# Patient Record
Sex: FEMALE | Race: WHITE
[De-identification: names, ages, dates, MRNs, and addresses within clinical notes are randomized per-mention and may not be internally consistent; named-entity substitution may affect disease eponyms.]

---
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
## 2018-11-09 NOTE — DIAGNOSTIC IMAGING REPORT
PROCEDURE:BIOPSY THYROID FNA

COMPARISON:[facility], US, OUTSIDE ULTRASOUND IMAGES, 

10/30/2018, 13:49.

INDICATIONS:ABN THYROID US

 

FINDINGS:

Written and verbal consent were obtained. Patient was placed supine. 

Preliminary ultrasound identified a dominant suspicious appearing right 

thyroid nodule (previously measured on the outside imaging to be 3.5 x 

3.3 x 2.5 cm). A safe entry route was identified and the overlying skin 

was prepped and draped in usual sterile fashion. Lidocaine 1% was used 

for local pain control.  

 

A total of 4 passes were performed utilizing 25 gauge needles in 

different locations of the right thyroid nodule.

 

The patient tolerated the procedure well and there were no immediate 

post-procedural complications.

 

CONCLUSION:

Successful ultrasound-guided FNA of a dominant right thyroid nodule. 

Pathologist has deemed the samples adequate for diagnosis. 

 

Salvador Victor D.O.  

Dictated by:  Salvador Victor D.O. on 11/09/2018 at 12:40     

Electronically approved by:  Salvador Victor D.O. on 11/09/2018 at 12:40

## 2018-11-09 NOTE — DIAGNOSTIC IMAGING REPORT
PROCEDURE:ULTRASOUND GUIDANCE FOR RIGHT THYROID NODULE FNA

 

Informed consent was obtained.  Ultrasound was used to identify the 

large right thyroid nodule.

 

The overlying skin was prepped and draped in the usual sterile fashion. 

Lidocaine 1% was infiltrated into the subcutaneous tissues for local 

anesthesia. 

 

CONCLUSION:Ultrasound guidance for FNA of the right thyroid nodule. 

 

 

Salvador Victor D.O.  

Dictated by:  Salvador Victor D.O. on 11/09/2018 at 16:57     

Electronically approved by:  Salvador Victor D.O. on 11/09/2018 at 16:57

## 2020-06-05 ENCOUNTER — HOSPITAL ENCOUNTER (OUTPATIENT)
Dept: HOSPITAL 88 - ER | Age: 55
Setting detail: OBSERVATION
LOS: 2 days | Discharge: HOME | End: 2020-06-07
Attending: INTERNAL MEDICINE | Admitting: INTERNAL MEDICINE
Payer: COMMERCIAL

## 2020-06-05 VITALS — SYSTOLIC BLOOD PRESSURE: 131 MMHG | DIASTOLIC BLOOD PRESSURE: 84 MMHG

## 2020-06-05 VITALS — DIASTOLIC BLOOD PRESSURE: 84 MMHG | SYSTOLIC BLOOD PRESSURE: 131 MMHG

## 2020-06-05 VITALS — DIASTOLIC BLOOD PRESSURE: 86 MMHG | SYSTOLIC BLOOD PRESSURE: 135 MMHG

## 2020-06-05 VITALS — HEIGHT: 66 IN | BODY MASS INDEX: 31.82 KG/M2 | WEIGHT: 198 LBS

## 2020-06-05 VITALS — SYSTOLIC BLOOD PRESSURE: 135 MMHG | DIASTOLIC BLOOD PRESSURE: 86 MMHG

## 2020-06-05 VITALS — SYSTOLIC BLOOD PRESSURE: 121 MMHG | DIASTOLIC BLOOD PRESSURE: 75 MMHG

## 2020-06-05 DIAGNOSIS — Z11.59: ICD-10-CM

## 2020-06-05 DIAGNOSIS — I10: ICD-10-CM

## 2020-06-05 DIAGNOSIS — E11.65: ICD-10-CM

## 2020-06-05 DIAGNOSIS — I25.110: Primary | ICD-10-CM

## 2020-06-05 DIAGNOSIS — E66.9: ICD-10-CM

## 2020-06-05 DIAGNOSIS — Z95.5: ICD-10-CM

## 2020-06-05 DIAGNOSIS — E78.00: ICD-10-CM

## 2020-06-05 LAB
ALBUMIN SERPL-MCNC: 4 G/DL (ref 3.5–5)
ALBUMIN/GLOB SERPL: 1.2 {RATIO} (ref 0.8–2)
ALP SERPL-CCNC: 76 IU/L (ref 40–150)
ALT SERPL-CCNC: 21 IU/L (ref 0–55)
ANION GAP SERPL CALC-SCNC: 13.9 MMOL/L (ref 8–16)
BASOPHILS # BLD AUTO: 0.1 10*3/UL (ref 0–0.1)
BASOPHILS NFR BLD AUTO: 0.8 % (ref 0–1)
BUN SERPL-MCNC: 18 MG/DL (ref 7–26)
BUN/CREAT SERPL: 22 (ref 6–25)
CALCIUM SERPL-MCNC: 9.4 MG/DL (ref 8.4–10.2)
CHLORIDE SERPL-SCNC: 103 MMOL/L (ref 98–107)
CK MB SERPL-MCNC: 3.8 NG/ML (ref 0–5)
CK MB SERPL-MCNC: 3.8 NG/ML (ref 0–5)
CK MB SERPL-MCNC: 3.9 NG/ML (ref 0–5)
CK SERPL-CCNC: 70 IU/L (ref 29–168)
CK SERPL-CCNC: 82 IU/L (ref 29–168)
CK SERPL-CCNC: 87 IU/L (ref 29–168)
CO2 SERPL-SCNC: 22 MMOL/L (ref 22–29)
DEPRECATED NEUTROPHILS # BLD AUTO: 4.5 10*3/UL (ref 2.1–6.9)
EGFRCR SERPLBLD CKD-EPI 2021: > 60 ML/MIN (ref 60–?)
EOSINOPHIL # BLD AUTO: 0.1 10*3/UL (ref 0–0.4)
EOSINOPHIL NFR BLD AUTO: 1.4 % (ref 0–6)
ERYTHROCYTE [DISTWIDTH] IN CORD BLOOD: 12.3 % (ref 11.7–14.4)
GLOBULIN PLAS-MCNC: 3.4 G/DL (ref 2.3–3.5)
GLUCOSE SERPLBLD-MCNC: 402 MG/DL (ref 74–118)
HCT VFR BLD AUTO: 43.2 % (ref 34.2–44.1)
HGB BLD-MCNC: 14.8 G/DL (ref 12–16)
LYMPHOCYTES # BLD: 2 10*3/UL (ref 1–3.2)
LYMPHOCYTES NFR BLD AUTO: 27.6 % (ref 18–39.1)
MCH RBC QN AUTO: 28.5 PG (ref 28–32)
MCHC RBC AUTO-ENTMCNC: 34.3 G/DL (ref 31–35)
MCV RBC AUTO: 83.2 FL (ref 81–99)
MONOCYTES # BLD AUTO: 0.5 10*3/UL (ref 0.2–0.8)
MONOCYTES NFR BLD AUTO: 6.6 % (ref 4.4–11.3)
NEUTS SEG NFR BLD AUTO: 62.6 % (ref 38.7–80)
PLATELET # BLD AUTO: 281 X10E3/UL (ref 140–360)
POTASSIUM SERPL-SCNC: 3.9 MMOL/L (ref 3.5–5.1)
RBC # BLD AUTO: 5.19 X10E6/UL (ref 3.6–5.1)
SODIUM SERPL-SCNC: 135 MMOL/L (ref 136–145)

## 2020-06-05 PROCEDURE — 80053 COMPREHEN METABOLIC PANEL: CPT

## 2020-06-05 PROCEDURE — 93005 ELECTROCARDIOGRAM TRACING: CPT

## 2020-06-05 PROCEDURE — 92928 PRQ TCAT PLMT NTRAC ST 1 LES: CPT

## 2020-06-05 PROCEDURE — 83880 ASSAY OF NATRIURETIC PEPTIDE: CPT

## 2020-06-05 PROCEDURE — 85025 COMPLETE CBC W/AUTO DIFF WBC: CPT

## 2020-06-05 PROCEDURE — 87635 SARS-COV-2 COVID-19 AMP PRB: CPT

## 2020-06-05 PROCEDURE — 82948 REAGENT STRIP/BLOOD GLUCOSE: CPT

## 2020-06-05 PROCEDURE — 99153 MOD SED SAME PHYS/QHP EA: CPT

## 2020-06-05 PROCEDURE — 71045 X-RAY EXAM CHEST 1 VIEW: CPT

## 2020-06-05 PROCEDURE — 93017 CV STRESS TEST TRACING ONLY: CPT

## 2020-06-05 PROCEDURE — 36415 COLL VENOUS BLD VENIPUNCTURE: CPT

## 2020-06-05 PROCEDURE — 93306 TTE W/DOPPLER COMPLETE: CPT

## 2020-06-05 PROCEDURE — 99284 EMERGENCY DEPT VISIT MOD MDM: CPT

## 2020-06-05 PROCEDURE — 84484 ASSAY OF TROPONIN QUANT: CPT

## 2020-06-05 PROCEDURE — 86140 C-REACTIVE PROTEIN: CPT

## 2020-06-05 PROCEDURE — 78452 HT MUSCLE IMAGE SPECT MULT: CPT

## 2020-06-05 PROCEDURE — 85379 FIBRIN DEGRADATION QUANT: CPT

## 2020-06-05 PROCEDURE — 93454 CORONARY ARTERY ANGIO S&I: CPT

## 2020-06-05 PROCEDURE — 80061 LIPID PANEL: CPT

## 2020-06-05 PROCEDURE — 82550 ASSAY OF CK (CPK): CPT

## 2020-06-05 PROCEDURE — 99152 MOD SED SAME PHYS/QHP 5/>YRS: CPT

## 2020-06-05 PROCEDURE — 82553 CREATINE MB FRACTION: CPT

## 2020-06-05 RX ADMIN — INSULIN HUMAN SCH UNIT: 100 INJECTION, SOLUTION PARENTERAL at 21:15

## 2020-06-05 RX ADMIN — INSULIN HUMAN SCH UNIT: 100 INJECTION, SOLUTION PARENTERAL at 17:10

## 2020-06-05 RX ADMIN — INSULIN HUMAN SCH UNIT: 100 INJECTION, SOLUTION PARENTERAL at 13:09

## 2020-06-05 NOTE — EMERGENCY DEPARTMENT NOTE
History of Present Illnes


History of Present Illness


Chief Complaint:  Chest Pain


History of Present Illness


This is a 55 year old  female  presents with complaint of sternal chest

pain for the last 3 days that waxes and wanes has been there more than it's been

gone. Patient saw her PCP yesterday had EKG and lab work done. Patient denies 

cough denies shortness of breath but does report that the pain is worse when she

tries to lay on her chest or when she takes a deep breath.. States the pain was 

there most all day yesterday and then eased up last night. And then about 7 

hours ago the pain returned and has been there ever since.


Historian:  Patient


Arrival Mode:  Car


Onset (how long ago):  day(s) (3)


Location:  midsternal chest


Quality:  pain


Radiation:  non-radiation


Severity:  moderate


Onset quality:  gradual


Duration (how long):  day(s) (3)


Timing of current episode:  constant, other


Progression:  waxing and waning


Chronicity:  new


Context:  recent illness, recent surgery


Relieving factors:  none


Exacerbating factors:  none


Associated symptoms:  denies other symptoms


Treatments prior to arrival:  none


 (FORTINO DUFF MD)





Past Medical/Family History


Physician Review


I have reviewed the patient's past medical and family history.  Any updates have

been documented here.


 (FORTINO DUFF MD)





Past Medical History


Recent Fever:  No


Clinical Suspicion of Infectio:  No


New/Unexplained Change in Ment:  No


Past Medical History:  Diabetes, Cancer (breast, remission for 15 years)


Other Surgery:  


bilateral mastectomy





 (FORTINO DUFF MD)





Social History


Smoking Cessation:  Never Smoker


Counseling Performed:  No


Alcohol Use:  None


Any Illegal Drug Use:  No


TB Exposure/Symptoms:  No


Physically hurt or threatened:  No


 (FORTINO DUFF MD)





Family History


Family history of heart diseas:  No


Other family history


htn


 (FORTINO DUFF MD)





Other


Last Tetanus:  unk


Is patient up to date on immun:  No


Last Flu:  DENIES


Last Pneumovax:  DENIES


 (FORTINO DUFF MD)





Review of Systems


Review of Systems


Constitutional:  no symptoms


EENTM:  no symptoms


Cardiovascular:  as per HPI


Respiratory:  as per HPI


Gastrointestinal:  no symptoms


Genitourinary:  no symptoms


Musculoskeletal:  no symptoms


Neurological:  no symptoms


Psychological:  no symptoms


Endocrine:  no symptoms


Hematological/Lymphatic:  no symptoms


Review of other systems


All other systems reviewed and negative.


 (FORTINO DUFF MD)





Physical Exam


Related Data


Allergies:  


Uncoded Allergies:  


     ERYTHROMYCIN (Allergy, Unknown, 10/27/18)


Triage Vital Signs





Vital Signs








  Date Time  Temp Pulse Resp B/P (MAP) Pulse Ox O2 Delivery O2 Flow Rate FiO2


 


6/5/20 05:39 98.0 105 18 160/104 98   








Vital signs reviewed:  Yes


 (FORTINO DUFF MD)





Physical Exam


CONSTITUTIONAL





Constitutional:  well-developed, well-nourished


HENT


HENT:  normocephalic, atraumatic, oropharynx clear/moist, nose normal


HENT L/R:  left ext ear normal, right ext ear normal


EYES





Eyes:  PERRL, conjunctivae normal


NECK


Neck:  ROM normal


PULMONARY


Pulmonary:  effort normal, breath sounds normal


CARDIOVASCULAR





Cardiovascular:  regular rhythm, heart sounds normal, capillary refill normal, 

normal rate, other (chest pain is reproducible with palpation of sternum, and 

with deep inspiration.)


GASTROINTESTINAL





Abdominal:  soft, nontender, bowel sounds normal


GENITOURINARY





Genitourinary:  exam deferred


SKIN


Skin:  warm, dry


MUSCULOSKELETAL





Musculoskeletal:  ROM normal


NEUROLOGICAL





Neurological:  alert, oriented x 3, no gross motor or sensory deficits


PSYCHOLOGICAL


Psychological:  mood/affect normal, judgement normal (FORTINO DUFF MD)





Procedures


12 Lead ECG Interpretation


:  Interpreted by ED physician


Date:  Jun 5, 2020


Time:  05:49


Rhythm:  sinus rhythm


Rate:  normal


BPM:  93


QRS axis:  normal


ST segments normal:  Yes


T waves normal:  Yes


Other findings:  no other findings


Q waves:  V1, V2, V3, V4


Clinical Impression:  abnormal ECG


 (FORTINO DUFF MD)





Critical Care Time


Subsequent provider


I assumed direction of critical care for this patient from another provider of 

my specialty.


 (FORTINO DUFF MD)





Assessment & Plan


Patient with chest pain for 3 days that waxes and wanes. Chest pain is 

reproducible on palpation of sternum.





CBC, CMP, cardiac enzymes, BNP, chest x-ray, EKG, and d-dimer ordered to eval 

for myocardial infarction, pneumonia, electrolyte abnormality, pulmonary embolus


 (FORTINO DUFF MD)


Final Impression:  


(1) UNSTABLE ANGINA


Assessment & Plan


Admission for further workup and management


 (HARMAN NUÑEZ, DO)


Last Vital Signs











  Date Time  Temp Pulse Resp B/P (MAP) Pulse Ox O2 Delivery O2 Flow Rate FiO2


 


6/5/20 05:39 98.0 105 18 160/104 98   








 (FORTINO DUFF MD)


Home Meds


Reported Medications


Lisinopril (LISINOPRIL) 10 Mg Tablet, 20 MG PO DAILY, #30 TAB


   6/5/20


Metformin Hcl (METFORMIN HCL) 500 Mg Tablet, 500 MG PO BID, #60 TAB


   6/5/20





Physician Attestation


Provider Attestation


55-year-old female brought to the ED with complaints of atypical chest pain, 

uncontrolled diabetic and hypertensive. Patient with a high heart score. EKG and

cardiac enzymes reviewed. Patient admitted for serial cardiac enzymes and 

cardiology evaluation. Case is discussed with Dr. Gallagher for further workup and 

management.


 (HARMAN NUÑEZ DO)











FORTINO DUFF MD         Jun 5, 2020 06:46


HARMAN NUÑEZ DO             Jun 5, 2020 16:56

## 2020-06-05 NOTE — NUR
Patient arrived to the unit @ 1103 via wheelchair. Patient in stable condition, no s/s of 
distress noted. No pain voiced. Telemetry applied. Iv site asymptomatic and patient with 
transparent dressing C/D/I. Bed in lowest position and locked. Call light within reach.

## 2020-06-05 NOTE — NUR
PATIENT UPDATED ON PLAN OF CARE, INCLUDING NEED FOR ADDITIONAL LAB RESULTS; 
PATIENT MEDICATED WITH TORADOL AT THIS TIME

## 2020-06-05 NOTE — NUR
Completed bedside shift report and rounding with the oncoming night nurse. patient in stable 
condition, no s/s of distress noted. No pain voiced. Telemetry applied. Bed in lowest 
position and locked. Call light within reach.
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
## 2020-06-05 NOTE — DIAGNOSTIC IMAGING REPORT
EXAMINATION:  CHEST SINGLE (NOT PORTABLE)   



COMPARISON:  None



INDICATION:  

^chest pain

^20200605

^0630

^Y   



DISCUSSION:

Frontal view of the chest obtained at 0640 hours.



HEART AND MEDIASTINUM:  The cardiomediastinal silhouette is unremarkable.

  

LINES:  None.



LUNGS/PLEURA:  The lungs are well inflated and clear. No pneumonia or pulmonary

edema. No pleural effusion or pneumothorax.



BONES AND SOFT TISSUES:  No focal osseous lesion. The soft tissues are normal.





IMPRESSION: 

No acute cardiopulmonary disease.



Signed by: Dr. Tc Rodriguez MD on 6/5/2020 7:03 AM
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
## 2020-06-05 NOTE — CONSULTATION
DATE OF CONSULTATION:  06/05/2020

 

Cardiology Consultation Note 

 

REQUESTING PHYSICIAN:  Tapan Duran M.D.

 

REASON FOR CONSULTATION:  Chest pain.

 

HISTORY OF PRESENT ILLNESS:  This is a 55-year-old woman with history of diabetes

mellitus and hypertension, who presents with complaints of chest pain.  She reports she

has been having palpitations for the last couple of months.  In the last few days, she

endorses chest tightness, which she describes as 11/10 in severity, associated with

shortness of breath and occasional diaphoresis.  There was no nausea or radiation.

Reports the pain has comes and goes, but has been more or less constant.  She reports

that chest pain is improved with sitting up and worse with lying down.  Of note, her

blood pressure has been running high for the last few days as well, so she saw her

primary care physician yesterday and was started on blood pressure medication.  She

denies any edema orthopnea or PND. 

 

REVIEW OF SYSTEMS:

Negative except as per HPI.

 

PAST MEDICAL HISTORY:  

1. Hypertension.

2. Diabetes mellitus.

 

PAST SURGICAL HISTORY:  

1. Hysterectomy.

2. Bilateral mastectomies for breast cancer.

3. Tonsillectomy.

 

ALLERGIES:  PLEASE SEE EMR.

 

MEDICATIONS:  Please see medication list.

 

SOCIAL HISTORY:  No tobacco, alcohol, or illicit drugs.

 

FAMILY HISTORY:  Pertinent for mother with atrial fibrillation and ICD.

 

PHYSICAL EXAMINATION:

VITAL SIGNS:  Temperature 98.1 degrees, pulse 86, respiratory rate 20, blood pressure

131/84, and oxygen saturation 99% on room air. 

GENERAL:  Well-developed, well-nourished woman, awake and alert, in no distress. 

HEENT:  Normocephalic, atraumatic.  Pupils equal.  No scleral icterus. 

NECK:  Supple.  No thyromegaly or cervical lymphadenopathy.  No carotid bruits. 

LUNGS:  Clear to auscultation bilaterally.  No wheeze or crackles. 

CARDIOVASCULAR:  Normal rate, regular rhythm.  No murmur.  Normal S1, S2. 

ABDOMEN:  Soft, nontender. 

EXTREMITIES:  No edema. 

NEUROLOGIC:  Nonfocal exam.

LABORATORY DATA:  WBCs 7.24, hemoglobin 14.8, hematocrit 43.2, and platelets 281.

Sodium 135, potassium 3.9, chloride 103, CO2 of 22, BUN 18, and creatinine 0.82.

Troponin I 0.130.  BNP 76.7. 

 

IMAGING DATA:  EKG, normal sinus rhythm, anterior infarct age undetermined. 

 

Chest x-ray no acute cardiopulmonary disease.

 

IMPRESSION:  

1. Chest pain.

2. Hypertension.

3. Diabetes mellitus.

 

RECOMMENDATIONS:  No evidence of myocardial infarction thus far.  Continue to trend

troponin.  Echocardiogram to evaluate for LV systolic function as well as regional wall

motion abnormalities.  Given her risk factors, ischemic evaluation is indicated with a

nuclear stress test.  Offered the patient plain treadmill.  The patient refuses to

treadmill as she had family member developed myocardial infarction during stress test

previously.  If the patient rules out for myocardial infarction and nuclear stress test

cannot be done today, discussed having the patient followup in the office for outpatient

nuclear stress test instead.  In the meantime, continue current cardiac medications.

Pain control per Primary.  Check CRP as I suspect this may be pericarditis instead given

the description of her symptoms.  Further recommendations pending test results. 

 

Thank you for this consult.  We will continue to follow.

 

 

 

______________________________

Lissette Chin MD

 

ABS/MODL

D:  06/05/2020 13:10:43

T:  06/05/2020 19:41:13

Job #:  [id number]

## 2020-06-06 VITALS — SYSTOLIC BLOOD PRESSURE: 145 MMHG | DIASTOLIC BLOOD PRESSURE: 92 MMHG

## 2020-06-06 VITALS — DIASTOLIC BLOOD PRESSURE: 80 MMHG | SYSTOLIC BLOOD PRESSURE: 124 MMHG

## 2020-06-06 VITALS — DIASTOLIC BLOOD PRESSURE: 97 MMHG | SYSTOLIC BLOOD PRESSURE: 145 MMHG

## 2020-06-06 VITALS — DIASTOLIC BLOOD PRESSURE: 85 MMHG | SYSTOLIC BLOOD PRESSURE: 159 MMHG

## 2020-06-06 VITALS — DIASTOLIC BLOOD PRESSURE: 100 MMHG | SYSTOLIC BLOOD PRESSURE: 188 MMHG

## 2020-06-06 VITALS — DIASTOLIC BLOOD PRESSURE: 86 MMHG | SYSTOLIC BLOOD PRESSURE: 167 MMHG

## 2020-06-06 VITALS — SYSTOLIC BLOOD PRESSURE: 181 MMHG | DIASTOLIC BLOOD PRESSURE: 107 MMHG

## 2020-06-06 LAB
CHOLEST SERPL-MCNC: 220 MD/DL (ref 0–199)
CHOLEST/HDLC SERPL: 4.7 {RATIO} (ref 3–3.6)
CK MB SERPL-MCNC: 2.3 NG/ML (ref 0–5)
CK SERPL-CCNC: 57 IU/L (ref 29–168)
HDLC SERPL-MSCNC: 47 MG/DL (ref 40–60)
LDLC SERPL CALC-MCNC: 139 MG/DL (ref 60–130)
TRIGL SERPL-MCNC: 168 MG/DL (ref 0–149)

## 2020-06-06 RX ADMIN — ATORVASTATIN CALCIUM SCH MG: 20 TABLET, FILM COATED ORAL at 21:00

## 2020-06-06 RX ADMIN — INSULIN HUMAN SCH UNIT: 100 INJECTION, SOLUTION PARENTERAL at 12:04

## 2020-06-06 RX ADMIN — INSULIN HUMAN SCH UNIT: 100 INJECTION, SOLUTION PARENTERAL at 08:30

## 2020-06-06 RX ADMIN — METOPROLOL TARTRATE SCH MG: 25 TABLET, FILM COATED ORAL at 20:33

## 2020-06-06 RX ADMIN — INSULIN HUMAN SCH UNIT: 100 INJECTION, SOLUTION PARENTERAL at 16:30

## 2020-06-06 RX ADMIN — NITROGLYCERIN PRN MG: 0.4 TABLET SUBLINGUAL at 04:00

## 2020-06-06 RX ADMIN — NITROGLYCERIN PRN MG: 0.4 TABLET SUBLINGUAL at 04:15

## 2020-06-06 RX ADMIN — ATORVASTATIN CALCIUM SCH MG: 20 TABLET, FILM COATED ORAL at 20:33

## 2020-06-06 RX ADMIN — NITROGLYCERIN PRN MG: 0.4 TABLET SUBLINGUAL at 04:50

## 2020-06-06 RX ADMIN — INSULIN HUMAN SCH UNIT: 100 INJECTION, SOLUTION PARENTERAL at 21:05

## 2020-06-06 RX ADMIN — NITROGLYCERIN PRN MG: 0.4 TABLET SUBLINGUAL at 07:25

## 2020-06-06 NOTE — HISTORY AND PHYSICAL
HISTORY OF PRESENT ILLNESS:  A 55 years old woman with past medical history positive for

diabetes and hypertension, came with chest pain.  She is saying she had palpitation for

a couple of months, 10/10 severity, associated with shortness of breath and diaphoresis.

 No nausea.  No radiation of pain.  The chest pain comes and goes, it is all over the

chest on the right than the left.  The chest pain is improved with sitting up and goes

with lying down. 

 

REVIEW OF SYSTEMS:

CARDIOVASCULAR:  Chest pain which is generalized in the chest with no radiation.  No

palpitation. 

RESPIRATORY:  No shortness of breath.  No cough. 

GASTROINTESTINAL:  No nausea, vomiting.  No diarrhea. 

GENITOURINARY:  No frequency.  No dysuria.

 

ALLERGIES:  LISTED IN CHART.

 

SOCIAL HISTORY:  She does not smoke, does not drink.

 

MEDICAL HISTORY:  Hypertension and diabetes.

 

PAST SURGICAL HISTORY:  Hysterectomy, bilateral mastectomy for breast cancer, and

tonsillectomy. 

 

PHYSICAL EXAMINATION:

HEART:  Regular rhythm.  Normal S1, S2 sound. 

LUNGS:  Clear bilaterally. 

ABDOMEN:  Soft. 

EXTREMITIES:  No edema.

LABORATORY DATA:  On the CBC; white blood count 7.24, hemoglobin 14.8, hematocrit 43.2,

and platelet count 281,000.  Sodium 135, potassium 3.9, chloride 103, CO2 of 22, BUN 18,

creatinine 0.82.  Troponin 0.130.  BNP 76.7. 

 

EKG; normal sinus rhythm, anterior infarct of age undetermined. 

 

Chest x-ray, no acute cardiopulmonary disease.

 

FINAL IMPRESSION:  

1. Chest pain.

2. Hypertension.

3. Uncontrolled diabetes mellitus type 2.

4. Obesity.

 

PLAN OF TREATMENT:  Troponins are negative.  Echocardiogram has been ordered. 

 

Chest x-ray has been done, the report is pending.  We are going to resume the rest of

the medication regimen that she has been taking.  She is taking Tylenol 650 mg q.6 hours

as needed for mild pain.  Continue to monitor blood sugar before meals and at bedtime.

Metoprolol 25 mg twice a day, nitroglycerin 0.4 mg q.5h minutes p.r.n. for chest pain.

Diet of course is going to be 

diabetic diet.  She is on lisinopril 20 mg daily, metformin 500 mg daily, but we are

going to hold on the metformin because of the possibility of cardiac cath if stress

testing is positive. 

 

 

 

 

______________________________

MD JAMI Carver/AG

D:  06/06/2020 14:04:27

T:  06/06/2020 14:58:44

Job #:  [id number]

## 2020-06-06 NOTE — NUR
Patient is back to the floor from cath lab. Right groin site was checked with night nurse 
and cath lab nurse. Site is clean and dry , no hematoma. patient understands she has to 
remain flat for two hours. Call light in reach

## 2020-06-07 VITALS — DIASTOLIC BLOOD PRESSURE: 102 MMHG | SYSTOLIC BLOOD PRESSURE: 139 MMHG

## 2020-06-07 VITALS — SYSTOLIC BLOOD PRESSURE: 139 MMHG | DIASTOLIC BLOOD PRESSURE: 101 MMHG

## 2020-06-07 VITALS — DIASTOLIC BLOOD PRESSURE: 101 MMHG | SYSTOLIC BLOOD PRESSURE: 154 MMHG

## 2020-06-07 VITALS — SYSTOLIC BLOOD PRESSURE: 149 MMHG | DIASTOLIC BLOOD PRESSURE: 99 MMHG

## 2020-06-07 VITALS — DIASTOLIC BLOOD PRESSURE: 78 MMHG | SYSTOLIC BLOOD PRESSURE: 118 MMHG

## 2020-06-07 RX ADMIN — METOPROLOL TARTRATE SCH MG: 25 TABLET, FILM COATED ORAL at 00:10

## 2020-06-07 RX ADMIN — INSULIN HUMAN SCH UNIT: 100 INJECTION, SOLUTION PARENTERAL at 08:18

## 2020-06-07 RX ADMIN — METOPROLOL TARTRATE SCH MG: 25 TABLET, FILM COATED ORAL at 08:17

## 2020-06-07 NOTE — NUR
2 DAY OBS.

S/P HEART CATH W STENT 6/6/2020

NOTED /101 AND .  

DISCUSSED W BEDSIDE RN; WESLEY.  STATES PT STARTED ON METOPROLOL AND PRINIVIL.  STATES 
WILL DC HOME LATER IF STABLE.

## 2020-06-07 NOTE — OPERATIVE REPORT
DATE OF PROCEDURE:  06/06/2020

 

SURGEON:  Yao Rosario MD

 

INDICATION FOR PROCEDURE:  Unstable angina, abnormal stress test.

 

PREPROCEDURE ASSESSMENT:  The risks, benefits, and alternatives to the treatment were

explained to the patient prior to the procedure.  The patient was deemed to be an

appropriate candidate for moderate sedation. 

 

MEDICATIONS:  Please see nursing notes for medications administered throughout the

procedure. 

 

PROCEDURES PERFORMED:  

1. Coronary angiography.

2. Left heart catheterization.

3. PCI to the diagonal branch with drug-eluting stent x1.

4. Femoral angiography.

5. Vascular closure device.

6. Moderate sedation x60 minutes.

 

PROCEDURE IN DETAILS:  The patient was brought to the cardiac catheterization laboratory

in a fasting state.  Right groin was prepped and draped in a sterile fashion.  A

6-Niuean sheath was inserted in the right common femoral artery using modified Seldinger

technique under fluoroscopic guidance.  Coronary angiography was performed using JL4 and

JR4 5-Niuean catheters.  Left heart catheterization was performed using a JR4 catheter.

Coronary angiography demonstrated thrombotic occlusion of a very large diagonal one

branch.  We decided to proceed with PCI. 

 

PCI of the diagonal one:  EBU 3.75 guide catheter was used, which provided adequate

support.  Lesion was wired using a run-through wire.  The lesion was predilated using a

2.5 x 12 mm balloon at 6 atmospheres.  Stenting was performed using a 2.5 x 15 mm

drug-eluting balloon.  Post dilation was performed using the stent balloon at 20

atmospheres proximally.  This resulted in excellent angiographic result without any

residual dissection, thrombus, or spasm and ЕКАТЕРИНА-3 flow.  All catheters were removed

over a wire.  Femoral angiography was performed, which showed arteriotomy site in the

right common femoral artery above the bifurcation.  This site was deemed to be amenable

for vascular closure device.  A 6-Niuean sheath was removed and the access site was

closed using a ProGlide vascular closure device without any significant problems.  This

resulted in excellent hemostasis. 

 

A CT near 300 was maintained throughout the procedure using heparin.  Antiplatelet

therapy was performed at the end of the case using aspirin 325 and Plavix 600 mg loading

doses. 

 

The patient tolerated the procedure well.  There were no immediate complications.

 

SIGNIFICANT FINDINGS:  

1. Left main coronary artery:  Very short to nonexistent left main.  No significant

disease. 

2. LAD:  Large vessel goes to the apex.  Very tortuous distally.  Large diagonal one

branch with 100% thrombotic occlusion in the proximal portion was also 40%

nonobstructive CAD of the mid LAD just after the origin of the diagonal branch.

Otherwise, there is no significant disease in the LAD. 

3. Left circumflex:  Large nondominant left circumflex with two significant OM branches,

tortuous distally, but no significant CAD. 

4. RCA:  Large dominant RCA with a large PDA and a medium size RPL systems, tortuous

branch vessel distally.  Mild plaquing otherwise in the mid RCA. 

5. LVEDP of 14 mmHg.  No gradient across the aortic valve.

 

GRAFTS AND IMPLANTS:  Drug-eluting stent x1 and ProGlide vascular closure device.

 

SPECIMEN REMOVED:  None.

 

COMPLICATIONS:  None.

 

ESTIMATED BLOOD LOSS:  20 mL.

 

FINAL RECOMMENDATIONS:  

1. Aspirin 81 mg for life and Plavix 75 mg daily for at least 1 year.

2. Follow up in the office 1-2 weeks post procedure.

 

 

 

 

______________________________

MD ROSARIO Maloney/AG

D:  06/06/2020 18:58:52

T:  06/07/2020 00:57:28

Job #:  [id number]

## 2020-06-08 NOTE — DISCHARGE SUMMARY
The patient is a 55-year-old female, who had a past medical history positive for

diabetes mellitus, hypertension, came here with chest pain.  She had an adenosine

Cardiolite stress test, which was normal.  She had a cardiac catheterization done by Dr. Rosario, which found a large diagonal branch with 100% thrombotic occlusion in the

proximal portion 40%.  No obstructive coronary artery disease with nil LAD.  The rest of

the coronary arteries were no significant abnormality.  The patient had a stent placed

apparently.  The patient going home today. 

 

PHYSICAL EXAMINATION:

HEART:  Showed regular rhythm.  Normal S1, S2 sound. 

LUNGS:  Clear bilaterally. 

ABDOMEN:  Soft. 

EXTREMITIES:  Show no edema.

LABORATORY DATA:  On the blood work, we have CBC; white count 7.24, hemoglobin 14.8,

hematocrit 43.2, and platelet count 281,000.  Last blood sugar is 271.  Cardiac enzymes

were negative.  On the BMP; sodium 135, potassium 3.9, chloride 103, CO2 of 22, BUN 18,

creatinine 0.2. 

 

IMPRESSION:  

1. Coronary artery disease with 100% stenosis on the diagonal branch of the LAD.

2. Uncontrolled diabetes mellitus type 2.

3. Hypercholesterolemia.

 

PLAN OF TREATMENT:  She is going to be discharged home with aspirin 81 mg daily, Plavix

75 mg daily.  She is going to be taking Lantus insulin 10 units at bedtime and Humalog 5

units before each meal.  She is going to be on lisinopril 20 mg daily, metoprolol 25 mg

twice a day, and also Lipitor 40 mg daily.  The patient going to follow with Dr. Rosario,

Cardiology as an outpatient. 

 

 

 

 

______________________________

MD JAMI Carver/MODL

D:  06/07/2020 12:26:03

T:  06/07/2020 18:49:59

Job #:  [id number]

## 2020-06-19 NOTE — MYOVIEW STRESS TEST
DATE OF STUDY:  06/06/2020 08:16:00

 

Stress Test - Treadmill ONLY

 

PROCEDURE TITLE:  Rest stress single isotope SPECT imaging with pharmacologic stress and

gated SPECT imaging. 

 

INDICATION:  Chest pain.

 

PROCEDURE IN DETAIL:  Pharmacologic stress testing was performed with regadenoson per

the usual protocol.  The heart rate was 83 beats per minute at rest and increased to 125

beats per minute at stress.  Baseline blood pressure was 164/106 and increased to

166/85.  Baseline 12-lead electrocardiogram showed normal sinus rhythm.  Throughout the

stress protocol, there were no ST changes suggestive of ischemia.  There were no

arrhythmias noted throughout the stress protocol or during recovery. 

 

Myocardial perfusion imaging was performed at rest following injection of 10.56

millicuries of tetrofosmin.  At peak pharmacologic effect, the patient was injected with

32.86 millicuries of tetrofosmin.  Gated post-stress tomographic imaging was performed. 

 

FINDINGS:  The study quality is fair.  The left ventricular cavity appears dilated at

both rest and stress.  SPECT images demonstrate a medium-sized severe fixed perfusion

defect.  The gated imaging reveals mild-to-moderate hypokinesis with severe hypokinesis

to akinesis of the anterior wall.  Left ventricular ejection fraction was calculated to

be 42%. 

 

IMPRESSION:  Myocardial perfusion imaging is abnormal.  There is a medium area of

transmural scar in the anterior wall.  The overall left ventricular systolic function

was mild-to-moderately depressed with a calculated ejection fraction of 42%. 

 

 

 

 

______________________________

DO EVA Posadas/FRANCISCAL

D:  06/19/2020 18:24:49

T:  06/19/2020 21:27:29

Job #:  [id number]

## 2020-07-09 NOTE — PROGRESS NOTE
DATE:  

 

Internal Medicine Progress Note

 

I came to my round.  The patient is still in the stress test, not in the floor yet.  We

will follow up after the stress test is done.  Cardiology is to follow with her. 

 

 

 

 

______________________________

MD JAMI Carver/AG

D:  06/06/2020 13:53:30

T:  06/06/2020 14:47:04

Job #:  [id number] <<-----Click here for Discharge Medication Review

## 2020-08-03 ENCOUNTER — APPOINTMENT (RX ONLY)
Dept: URBAN - METROPOLITAN AREA CLINIC 118 | Facility: CLINIC | Age: 55
Setting detail: DERMATOLOGY
End: 2020-08-03

## 2020-08-03 DIAGNOSIS — L73.2 HIDRADENITIS SUPPURATIVA: ICD-10-CM

## 2020-08-03 PROCEDURE — ? ORDER TESTS

## 2020-08-03 PROCEDURE — ? COUNSELING

## 2020-08-03 PROCEDURE — ? PRESCRIPTION

## 2020-08-03 PROCEDURE — ? TREATMENT REGIMEN

## 2020-08-03 PROCEDURE — 99202 OFFICE O/P NEW SF 15 MIN: CPT

## 2020-08-03 RX ORDER — MINOCYCLINE HYDROCHLORIDE 100 MG/1
CAPSULE ORAL
Qty: 60 | Refills: 3 | Status: ERX | COMMUNITY
Start: 2020-08-03

## 2020-08-03 RX ADMIN — MINOCYCLINE HYDROCHLORIDE: 100 CAPSULE ORAL at 00:00

## 2020-08-03 ASSESSMENT — LOCATION DETAILED DESCRIPTION DERM
LOCATION DETAILED: MONS PUBIS
LOCATION DETAILED: RIGHT INGUINAL CREASE
LOCATION DETAILED: LEFT INGUINAL CREASE

## 2020-08-03 ASSESSMENT — LOCATION SIMPLE DESCRIPTION DERM: LOCATION SIMPLE: GROIN

## 2020-08-03 ASSESSMENT — HURLEY STAGE
IN YOUR EXPERIENCE, AMONG ALL PATIENTS YOU HAVE SEEN WITH THIS CONDITION, HOW SEVERE IS THIS PATIENT'S CONDITION?: HURLEY STAGE II: SINGLE OR MULTIPLE, WIDELY SEPARATED RECURRENT ABSCESSES WITH SINUS TRACT FORMATION AND SCARRING

## 2020-08-03 ASSESSMENT — LOCATION ZONE DERM
LOCATION ZONE: VULVA
LOCATION ZONE: TRUNK

## 2020-08-03 NOTE — PROCEDURE: ORDER TESTS
Lab Facility: [id number]
Expected Date Of Service: 08/03/2020
Billing Type: Third-Party Bill
Performing Laboratory: [id number]
Bill For Surgical Tray: no

## 2020-08-03 NOTE — PROCEDURE: TREATMENT REGIMEN
Plan: Bacterial culture. \\nMay consider isotretinoin. Discussed Humira and risk related to history of breast cancer. Will hold off consideration of Humira at this time
Detail Level: Detailed
Initiate Treatment: Minocycline 100mg bid

## 2020-09-03 ENCOUNTER — APPOINTMENT (RX ONLY)
Dept: URBAN - METROPOLITAN AREA CLINIC 120 | Facility: CLINIC | Age: 55
Setting detail: DERMATOLOGY
End: 2020-09-03

## 2020-09-03 DIAGNOSIS — D22 MELANOCYTIC NEVI: ICD-10-CM

## 2020-09-03 DIAGNOSIS — D485 NEOPLASM OF UNCERTAIN BEHAVIOR OF SKIN: ICD-10-CM

## 2020-09-03 DIAGNOSIS — L73.2 HIDRADENITIS SUPPURATIVA: ICD-10-CM

## 2020-09-03 DIAGNOSIS — D18.0 HEMANGIOMA: ICD-10-CM

## 2020-09-03 PROBLEM — D18.01 HEMANGIOMA OF SKIN AND SUBCUTANEOUS TISSUE: Status: ACTIVE | Noted: 2020-09-03

## 2020-09-03 PROBLEM — D48.5 NEOPLASM OF UNCERTAIN BEHAVIOR OF SKIN: Status: ACTIVE | Noted: 2020-09-03

## 2020-09-03 PROBLEM — D22.62 MELANOCYTIC NEVI OF LEFT UPPER LIMB, INCLUDING SHOULDER: Status: ACTIVE | Noted: 2020-09-03

## 2020-09-03 PROBLEM — D22.5 MELANOCYTIC NEVI OF TRUNK: Status: ACTIVE | Noted: 2020-09-03

## 2020-09-03 PROCEDURE — ? OBSERVATION AND MEASURE

## 2020-09-03 PROCEDURE — 99213 OFFICE O/P EST LOW 20 MIN: CPT | Mod: 25

## 2020-09-03 PROCEDURE — ? BIOPSY BY SHAVE METHOD

## 2020-09-03 PROCEDURE — 11102 TANGNTL BX SKIN SINGLE LES: CPT

## 2020-09-03 PROCEDURE — ? TREATMENT REGIMEN

## 2020-09-03 ASSESSMENT — LOCATION ZONE DERM
LOCATION ZONE: ARM
LOCATION ZONE: TRUNK

## 2020-09-03 ASSESSMENT — LOCATION DETAILED DESCRIPTION DERM
LOCATION DETAILED: GENITALIA
LOCATION DETAILED: EPIGASTRIC SKIN
LOCATION DETAILED: RIGHT SUPERIOR LATERAL UPPER BACK
LOCATION DETAILED: LEFT POSTERIOR SHOULDER
LOCATION DETAILED: RIGHT POSTERIOR SHOULDER

## 2020-09-03 ASSESSMENT — LOCATION SIMPLE DESCRIPTION DERM
LOCATION SIMPLE: RIGHT BACK
LOCATION SIMPLE: RIGHT SHOULDER
LOCATION SIMPLE: GENITALIA
LOCATION SIMPLE: LEFT SHOULDER
LOCATION SIMPLE: ABDOMEN

## 2020-09-03 NOTE — PROCEDURE: OBSERVATION
Detail Level: Simple
Size Of Lesion: 4x3mm
Morphology Per Location (Optional): BM
Size Of Lesion: 6mm
Morphology Per Location (Optional): FTP

## 2020-09-03 NOTE — PROCEDURE: BIOPSY BY SHAVE METHOD
Detail Level: Detailed
Depth Of Biopsy: dermis
Was A Bandage Applied: Yes
Size Of Lesion In Cm: 0.4
X Size Of Lesion In Cm: 0
Biopsy Type: H and E
Biopsy Method: 15 blade
Anesthesia Type: 1% lidocaine with epinephrine
Anesthesia Volume In Cc (Will Not Render If 0): 0.5
Hemostasis: Drysol
Wound Care: Petrolatum
Dressing: bandage
Type Of Destruction Used: Curettage
Curettage Text: The wound bed was treated with curettage after the biopsy was performed.
Cryotherapy Text: The wound bed was treated with cryotherapy after the biopsy was performed.
Electrodesiccation Text: The wound bed was treated with electrodesiccation after the biopsy was performed.
Electrodesiccation And Curettage Text: The wound bed was treated with electrodesiccation and curettage after the biopsy was performed.
Silver Nitrate Text: The wound bed was treated with silver nitrate after the biopsy was performed.
Lab: [id number]
Lab Facility: [id number]
Render Path Notes In Note?: No
Consent: Written consent was obtained and risks were reviewed including but not limited to scarring, infection, bleeding, scabbing, incomplete removal, nerve damage and allergy to anesthesia.
Post-Care Instructions: I reviewed with the patient in detail post-care instructions. Patient is to keep the biopsy site dry overnight, and then apply bacitracin twice daily until healed. Patient may apply hydrogen peroxide soaks to remove any crusting.
Notification Instructions: Patient will be notified of biopsy results. However, patient instructed to call the office if not contacted within 2 weeks.
Billing Type: Third-Party Bill
Information: Selecting Yes will display possible errors in your note based on the variables you have selected. This validation is only offered as a suggestion for you. PLEASE NOTE THAT THE VALIDATION TEXT WILL BE REMOVED WHEN YOU FINALIZE YOUR NOTE. IF YOU WANT TO FAX A PRELIMINARY NOTE YOU WILL NEED TO TOGGLE THIS TO 'NO' IF YOU DO NOT WANT IT IN YOUR FAXED NOTE.